# Patient Record
Sex: FEMALE | Race: WHITE | NOT HISPANIC OR LATINO | URBAN - METROPOLITAN AREA
[De-identification: names, ages, dates, MRNs, and addresses within clinical notes are randomized per-mention and may not be internally consistent; named-entity substitution may affect disease eponyms.]

---

## 2017-05-19 ENCOUNTER — EMERGENCY (EMERGENCY)
Facility: HOSPITAL | Age: 72
LOS: 1 days | Discharge: HOME | End: 2017-05-19
Attending: EMERGENCY MEDICINE | Admitting: EMERGENCY MEDICINE
Payer: MEDICARE

## 2017-05-19 VITALS
RESPIRATION RATE: 18 BRPM | DIASTOLIC BLOOD PRESSURE: 90 MMHG | TEMPERATURE: 98 F | HEART RATE: 68 BPM | SYSTOLIC BLOOD PRESSURE: 166 MMHG | OXYGEN SATURATION: 99 %

## 2017-05-19 VITALS — WEIGHT: 162.04 LBS | HEIGHT: 63 IN

## 2017-05-19 DIAGNOSIS — D32.0 BENIGN NEOPLASM OF CEREBRAL MENINGES: Chronic | ICD-10-CM

## 2017-05-19 DIAGNOSIS — M71.22 SYNOVIAL CYST OF POPLITEAL SPACE [BAKER], LEFT KNEE: ICD-10-CM

## 2017-05-19 DIAGNOSIS — Z79.899 OTHER LONG TERM (CURRENT) DRUG THERAPY: ICD-10-CM

## 2017-05-19 DIAGNOSIS — M79.662 PAIN IN LEFT LOWER LEG: ICD-10-CM

## 2017-05-19 PROCEDURE — 99284 EMERGENCY DEPT VISIT MOD MDM: CPT

## 2017-05-19 PROCEDURE — 93971 EXTREMITY STUDY: CPT | Mod: 26,LT

## 2017-05-19 RX ORDER — ESOMEPRAZOLE MAGNESIUM 40 MG/1
0 CAPSULE, DELAYED RELEASE ORAL
Qty: 0 | Refills: 0 | COMMUNITY

## 2017-05-19 NOTE — ED PROVIDER NOTE - NS ED MD SCRIBE ATTENDING SCRIBE SECTIONS
PAST MEDICAL/SURGICAL/SOCIAL HISTORY/VITAL SIGNS( Pullset)/PHYSICAL EXAM/REVIEW OF SYSTEMS/DISPOSITION/HIV/HISTORY OF PRESENT ILLNESS

## 2017-05-19 NOTE — ED PROVIDER NOTE - OBJECTIVE STATEMENT
70 yo F with a hx of GERD, meningioma presents with left leg discomfort and sent in by PMD to r/o DVT. Pt states she had a strenuous exercise last Friday and then had her  come 4 days ago. Discomfort persisted and went to get a massage when pain became worse. Pt called PMD who referred pt to this ED. Denies leg swelling, CP, SOB, difficulty breathing. Not on hormone therapy, no recent travel. Non-smoker and socially drinks.

## 2017-05-19 NOTE — ED PROVIDER NOTE - MEDICAL DECISION MAKING DETAILS
left leg discomfort sent to r/o DVT. US negative for DVT but consistent with Baker's cyst. Will give return precautions, NSAID as needed for pain and f/u with orthopedics. left leg discomfort sent to r/o DVT. US negative for DVT but consistent with popliteal cyst. Will give return precautions, NSAID as needed for pain and f/u with orthopedics.

## 2017-05-19 NOTE — ED ADULT TRIAGE NOTE - CHIEF COMPLAINT QUOTE
Pt c/o left leg pain s/p workout and massage one week ago. PMD requests doppler, endorses improvement of symptoms with compression stocking. C/o increased fatigue over last few days

## 2017-05-19 NOTE — ED PROVIDER NOTE - MUSCULOSKELETAL, MLM
Spine appears normal, range of motion is not limited, no muscle or joint tenderness, no bilateral lower extremity edema.

## 2019-02-03 ENCOUNTER — EMERGENCY (EMERGENCY)
Facility: HOSPITAL | Age: 74
LOS: 1 days | Discharge: HOME | End: 2019-02-03
Attending: EMERGENCY MEDICINE | Admitting: EMERGENCY MEDICINE
Payer: MEDICARE

## 2019-02-03 VITALS
SYSTOLIC BLOOD PRESSURE: 152 MMHG | HEART RATE: 75 BPM | OXYGEN SATURATION: 99 % | TEMPERATURE: 98 F | WEIGHT: 169.98 LBS | RESPIRATION RATE: 16 BRPM | DIASTOLIC BLOOD PRESSURE: 88 MMHG

## 2019-02-03 DIAGNOSIS — D32.0 BENIGN NEOPLASM OF CEREBRAL MENINGES: Chronic | ICD-10-CM

## 2019-02-03 PROCEDURE — 99283 EMERGENCY DEPT VISIT LOW MDM: CPT

## 2019-02-03 RX ORDER — AZTREONAM 2 G
1 VIAL (EA) INJECTION
Qty: 20 | Refills: 0 | OUTPATIENT
Start: 2019-02-03 | End: 2019-02-12

## 2019-02-03 RX ORDER — MUPIROCIN 20 MG/G
1 OINTMENT TOPICAL
Qty: 15 | Refills: 0 | OUTPATIENT
Start: 2019-02-03 | End: 2019-02-12

## 2019-02-03 RX ADMIN — Medication 1 TABLET(S): at 18:22

## 2019-02-03 NOTE — ED PROVIDER NOTE - OBJECTIVE STATEMENT
73 y.o F with PMHx thyroid surgery, Meningioma, and GERD presents to the ED with c/o abscess under right breast fold. Pt states she had sebaceous cyst 3 years ago. The cyst ruptured with purulent drainage and grew back. Pt has seen Breast surgeon at [place] for this cyst. She states she has been attempting to self- treat the sebaceous cyst. Reports area appears to be red, warm to the touch, with slight drainage. Denies fever, chills, N/V, sudden weight loss, night sweats, 73 y.o F with PMHx thyroid surgery, Meningioma, and GERD presents to the ED with c/o abscess under right breast fold. Pt states she had sebaceous cyst 3 years ago. The cyst ruptured with purulent drainage and grew back. Pt has seen Breast surgeon at [place] for this cyst. She states she has been attempting to self- treat the sebaceous cyst. Reports area appears to be red, warm to the touch, with slight drainage. Denies fever, chills, N/V, sudden weight loss, night sweats.  Denies hx of breast or other cancers.

## 2019-02-03 NOTE — ED ADULT NURSE NOTE - NSIMPLEMENTINTERV_GEN_ALL_ED
Implemented All Universal Safety Interventions:  [place] to call system. Call bell, personal items and telephone within reach. Instruct patient to call for assistance. Room bathroom lighting operational. Non-slip footwear when patient is off stretcher. Physically safe environment: no spills, clutter or unnecessary equipment. Stretcher in lowest position, wheels locked, appropriate side rails in place.

## 2019-02-03 NOTE — ED PROVIDER NOTE - GASTROINTESTINAL NEGATIVE STATEMENT, MLM
Patient will come to orthopedic office to : prescription and letter.   Patient was advised of location and hours: Yes.   Patient was advised to bring photo identification: Yes.   Patient elects another party to  item: no.   no abdominal pain, no bloating, no constipation, no diarrhea, no nausea and no vomiting. no nausea and no vomiting.

## 2019-02-03 NOTE — ED PROVIDER NOTE - NSFOLLOWUPINSTRUCTIONS_ED_ALL_ED_FT
Please follow up with your PMD as discussed.  Return if you have worsening pain, shortness of breath, fever, chills, or worsening symptoms.  Take the medication as prescribed.     Warm compresses as discussed 3-4 times  a day

## 2019-02-03 NOTE — ED PROVIDER NOTE - SKIN, MLM
1.5cm firm cystic appearing in the area of the breastfold medial aspect with surrounding erythema. No drainage or foul order.

## 2019-02-03 NOTE — ED PROVIDER NOTE - CARE PROVIDER_API CALL
Nancy Galindo)  Dermatology  [address] [place] [address], [place] [address]  Phone: [phone]  Fax: [phone]

## 2019-02-03 NOTE — ED PROVIDER NOTE - DISPOSITION TYPE
Patient was using duoneb frequently according to Dr. WILMAN amanda. If this is still case, duoneb can cause muscle spasms. If drinking a lot of water, she can try substituting Smart water or Powerade. If this does not work would need to be evaluated in clinic     DISCHARGE

## 2019-02-07 DIAGNOSIS — N61.1 ABSCESS OF THE BREAST AND NIPPLE: ICD-10-CM

## 2019-02-07 DIAGNOSIS — Z79.899 OTHER LONG TERM (CURRENT) DRUG THERAPY: ICD-10-CM

## 2019-02-07 DIAGNOSIS — Z79.2 LONG TERM (CURRENT) USE OF ANTIBIOTICS: ICD-10-CM

## 2019-06-16 ENCOUNTER — EMERGENCY (EMERGENCY)
Facility: HOSPITAL | Age: 74
LOS: 1 days | Discharge: HOME | End: 2019-06-16
Admitting: EMERGENCY MEDICINE
Payer: MEDICARE

## 2019-06-16 VITALS
DIASTOLIC BLOOD PRESSURE: 90 MMHG | HEART RATE: 75 BPM | TEMPERATURE: 98 F | SYSTOLIC BLOOD PRESSURE: 160 MMHG | RESPIRATION RATE: 18 BRPM | OXYGEN SATURATION: 98 %

## 2019-06-16 DIAGNOSIS — L03.312 CELLULITIS OF BACK [ANY PART EXCEPT BUTTOCK]: ICD-10-CM
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
## 2019-06-16 DIAGNOSIS — D32.0 BENIGN NEOPLASM OF CEREBRAL MENINGES: Chronic | ICD-10-CM

## 2019-06-16 DIAGNOSIS — T21.24XD BURN OF SECOND DEGREE OF LOWER BACK, SUBSEQUENT ENCOUNTER: ICD-10-CM

## 2019-06-16 DIAGNOSIS — Z79.899 OTHER LONG TERM (CURRENT) DRUG THERAPY: ICD-10-CM

## 2019-06-16 PROCEDURE — 99283 EMERGENCY DEPT VISIT LOW MDM: CPT

## 2019-06-16 RX ORDER — CEPHALEXIN 500 MG
1 CAPSULE ORAL
Qty: 28 | Refills: 0
Start: 2019-06-16 | End: 2019-06-22

## 2019-06-16 RX ORDER — CEPHALEXIN 500 MG
500 CAPSULE ORAL ONCE
Refills: 0 | Status: COMPLETED | OUTPATIENT
Start: 2019-06-16 | End: 2019-06-16

## 2019-06-16 RX ORDER — IBUPROFEN 200 MG
600 TABLET ORAL ONCE
Refills: 0 | Status: COMPLETED | OUTPATIENT
Start: 2019-06-16 | End: 2019-06-16

## 2019-06-16 RX ADMIN — Medication 600 MILLIGRAM(S): at 21:06

## 2019-06-16 RX ADMIN — Medication 500 MILLIGRAM(S): at 21:06
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
## 2019-06-16 NOTE — ED ADULT NURSE NOTE - NSIMPLEMENTINTERV_GEN_ALL_ED
Implemented All Universal Safety Interventions:  [place] to call system. Call bell, personal items and telephone within reach. Instruct patient to call for assistance. Room bathroom lighting operational. Non-slip footwear when patient is off stretcher. Physically safe environment: no spills, clutter or unnecessary equipment. Stretcher in lowest position, wheels locked, appropriate side rails in place.

## 2019-06-16 NOTE — ED PROVIDER NOTE - PHYSICAL EXAMINATION
CONSTITUTIONAL: Well-appearing; well-nourished; in no apparent distress.   HEAD: Normocephalic; atraumatic.   EYES: PERRL; EOM intact; conjunctiva and sclera clear  ENT: normal nose; no rhinorrhea; normal pharynx with no erythema or lesions.   NECK: Supple; non-tender;   CARDIOVASCULAR: Normal S1, S2; no murmurs, rubs, or gallops. Regular rate and rhythm.   RESPIRATORY: Breathing easily; breath sounds clear and equal bilaterally; no wheezes, rhonchi, or rales.  MSK: FROM at all extremities, normal tone   EXT: No cyanosis or edema; N/V intact  SKIN: R lower back 3 circular areas ~3x3 cm each of healing blister with scaling and erythema. No surrounding erythema or warmth, no drainage

## 2019-06-16 NOTE — ED PROVIDER NOTE - NSFOLLOWUPINSTRUCTIONS_ED_ALL_ED_FT
Burn    A burn is an injury to your skin or the tissues under your skin usually caused by heat or caustic chemicals. In severe cases, a burn can damage the muscles and bones under the skin. There are three different degrees of burns: first (mild), second, and third (severe). Make sure to use any prescribed ointments as directed. If you were prescribed antibiotic medicine, take it as told by your health care provider. Do not stop using the antibiotic even if your condition improves. Follow up is available at the burn clinic.    SEEK IMMEDIATE MEDICAL CARE IF YOU HAVE ANY OF THE FOLLOWING SYMPTOMS: red streaks near the burn, severe pain, or fever.    Cellulitis    Cellulitis is a skin infection caused by bacteria. This condition occurs most often in the arms and lower legs but can occur anywhere over the body. Symptoms include redness, swelling, warm skin, tenderness, and chills/fever. If you were prescribed an antibiotic medicine, take it as told by your health care provider. Do not stop taking the antibiotic even if you start to feel better.    SEEK IMMEDIATE MEDICAL CARE IF YOU HAVE ANY OF THE FOLLOWING SYMPTOMS: worsening fever, red streaks coming from affected area, vomiting or diarrhea, or dizziness/lightheadedness.
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
## 2019-09-16 NOTE — ED ADULT NURSE NOTE - CAS EDN DISCHARGE ASSESSMENT
[FreeTextEntry1] : I have personally reviewed all relevant imaging studies (PET, CT) and pathology and endoscopy reports. I have discussed the case with the referring physician Dr. Clifton and Dr. Caba.\par  Alert and oriented to person, place and time

## 2020-08-12 ENCOUNTER — APPOINTMENT (OUTPATIENT)
Dept: OTOLARYNGOLOGY | Facility: CLINIC | Age: 75
End: 2020-08-12
Payer: MEDICARE

## 2020-08-12 VITALS — TEMPERATURE: 97.8 F | BODY MASS INDEX: 27.66 KG/M2 | WEIGHT: 162 LBS | HEIGHT: 64 IN

## 2020-08-12 DIAGNOSIS — K21.9 GASTRO-ESOPHAGEAL REFLUX DISEASE W/OUT ESOPHAGITIS: ICD-10-CM

## 2020-08-12 DIAGNOSIS — Z83.3 FAMILY HISTORY OF DIABETES MELLITUS: ICD-10-CM

## 2020-08-12 DIAGNOSIS — Z82.49 FAMILY HISTORY OF ISCHEMIC HEART DISEASE AND OTHER DISEASES OF THE CIRCULATORY SYSTEM: ICD-10-CM

## 2020-08-12 DIAGNOSIS — Z78.9 OTHER SPECIFIED HEALTH STATUS: ICD-10-CM

## 2020-08-12 PROBLEM — Z00.00 ENCOUNTER FOR PREVENTIVE HEALTH EXAMINATION: Status: ACTIVE | Noted: 2020-08-12

## 2020-08-12 PROCEDURE — 99213 OFFICE O/P EST LOW 20 MIN: CPT | Mod: 25

## 2020-08-12 PROCEDURE — 69210 REMOVE IMPACTED EAR WAX UNI: CPT

## 2020-08-12 RX ORDER — ESOMEPRAZOLE MAGNESIUM 40 MG/1
CAPSULE, DELAYED RELEASE ORAL
Refills: 0 | Status: ACTIVE | COMMUNITY

## 2020-08-12 RX ORDER — LEVOTHYROXINE SODIUM 137 UG/1
TABLET ORAL
Refills: 0 | Status: ACTIVE | COMMUNITY

## 2020-08-12 NOTE — HISTORY OF PRESENT ILLNESS
[de-identified] : APOORVA SORENSON is a 74 year woman with a history of thyroidectomy for Goiter several years ago. She had been having discomfort in the neck. She thinks it may be due to excessive  Pilates work outs. When she refrains she feels better. Recent US is normal. It showed  some lymph nodes with normal morphology and size.\par \par She is also having GERD. She uses Nexium but gets burning in her chest particularly if she misses a dose.

## 2020-08-12 NOTE — ASSESSMENT
[FreeTextEntry1] : APOORVA SORENSON is doing well. I reassured her regarding her neck.\par I discussed reflux diet with her and she will make some changes.

## 2020-08-12 NOTE — REVIEW OF SYSTEMS
[Vertigo] : vertigo [Throat Clearing] : throat clearing [Negative] : Endocrine [Patient Intake Form Reviewed] : Patient intake form was reviewed [de-identified] : post nasal drip, watery eyes  [de-identified] : refulux

## 2021-02-10 ENCOUNTER — APPOINTMENT (OUTPATIENT)
Dept: OTOLARYNGOLOGY | Facility: CLINIC | Age: 76
End: 2021-02-10
Payer: MEDICARE

## 2021-02-10 VITALS — BODY MASS INDEX: 27.66 KG/M2 | TEMPERATURE: 98.7 F | HEIGHT: 64 IN | WEIGHT: 162 LBS

## 2021-02-10 PROCEDURE — 99212 OFFICE O/P EST SF 10 MIN: CPT | Mod: 25

## 2021-02-10 PROCEDURE — 69210 REMOVE IMPACTED EAR WAX UNI: CPT

## 2021-02-10 RX ORDER — SIMVASTATIN 20 MG/1
20 TABLET, FILM COATED ORAL
Qty: 90 | Refills: 0 | Status: ACTIVE | COMMUNITY
Start: 2021-01-13

## 2021-02-10 RX ORDER — METFORMIN HYDROCHLORIDE 500 MG/1
500 TABLET, COATED ORAL
Qty: 60 | Refills: 0 | Status: ACTIVE | COMMUNITY
Start: 2020-11-30

## 2021-02-10 RX ORDER — SUMATRIPTAN 50 MG/1
50 TABLET, FILM COATED ORAL
Qty: 10 | Refills: 0 | Status: ACTIVE | COMMUNITY
Start: 2021-01-18

## 2021-02-10 RX ORDER — LEVOTHYROXINE SODIUM 0.17 MG/1
175 TABLET ORAL
Qty: 90 | Refills: 0 | Status: ACTIVE | COMMUNITY
Start: 2020-03-12

## 2021-02-10 NOTE — HISTORY OF PRESENT ILLNESS
[de-identified] : APOORVA SORENSON is a 75 year woman with a history of Goiter. She feels that she has a growth in her right ear.

## 2021-02-10 NOTE — ASSESSMENT
[FreeTextEntry1] : APOORVA SORENSON has benign cyst of left scapha. I suggested she see Dr. Conley if she wants it excised.

## 2021-02-10 NOTE — REVIEW OF SYSTEMS
[Ear Pain] : ear pain [Negative] : Heme/Lymph [Patient Intake Form Reviewed] : Patient intake form was reviewed [de-identified] : growth in ear

## 2021-08-04 ENCOUNTER — APPOINTMENT (OUTPATIENT)
Dept: OTOLARYNGOLOGY | Facility: CLINIC | Age: 76
End: 2021-08-04
Payer: MEDICARE

## 2021-08-04 VITALS — HEIGHT: 64 IN | BODY MASS INDEX: 27.66 KG/M2 | TEMPERATURE: 95.1 F | WEIGHT: 162 LBS

## 2021-08-04 DIAGNOSIS — Q18.1 PREAURICULAR SINUS AND CYST: ICD-10-CM

## 2021-08-04 PROCEDURE — 99213 OFFICE O/P EST LOW 20 MIN: CPT | Mod: 25

## 2021-08-04 PROCEDURE — 69210 REMOVE IMPACTED EAR WAX UNI: CPT
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
## 2022-02-02 ENCOUNTER — APPOINTMENT (OUTPATIENT)
Dept: OTOLARYNGOLOGY | Facility: CLINIC | Age: 77
End: 2022-02-02

## 2022-11-03 NOTE — ED PROVIDER NOTE - CPE EDP NEURO NORM
Patient LM asking if full face mask had been ordered because she hasn't received anything yet. I called back and informed patient he did send the order, but she would need to call DME to actually order mask. I provided [facility]'s number. normal...

## 2023-01-28 NOTE — ED PROVIDER NOTE - MEDICAL DECISION MAKING DETAILS
Luda Keith Possible infected cyst.  ~ 1 cm, no drainage although has been draining at home.  NO fever or chills.  Located in the inferior breast fold.  Reviewed vitals.  Treat with topical and oral antibiotics + warm compress.  Will refer to dermatology for definitive care.  No role for I and D at this time.  Conservative management discussed with the patient in detail.    Strict ED return instructions discussed in detail and patient given the opportunity to ask any questions about their discharge diagnosis and instructions

## 2023-06-28 ENCOUNTER — APPOINTMENT (OUTPATIENT)
Dept: OTOLARYNGOLOGY | Facility: CLINIC | Age: 78
End: 2023-06-28
Payer: MEDICARE

## 2023-06-28 DIAGNOSIS — H61.23 IMPACTED CERUMEN, BILATERAL: ICD-10-CM

## 2023-06-28 DIAGNOSIS — H93.8X9 OTHER SPECIFIED DISORDERS OF EAR, UNSPECIFIED EAR: ICD-10-CM

## 2023-06-28 PROCEDURE — 99212 OFFICE O/P EST SF 10 MIN: CPT | Mod: 25

## 2023-06-28 PROCEDURE — 31575 DIAGNOSTIC LARYNGOSCOPY: CPT | Mod: 1L

## 2023-06-28 PROCEDURE — 69210 REMOVE IMPACTED EAR WAX UNI: CPT
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
## 2023-12-20 ENCOUNTER — APPOINTMENT (OUTPATIENT)
Dept: OTOLARYNGOLOGY | Facility: CLINIC | Age: 78
End: 2023-12-20

## 2024-10-09 ENCOUNTER — APPOINTMENT (OUTPATIENT)
Dept: OTOLARYNGOLOGY | Facility: CLINIC | Age: 79
End: 2024-10-09
Payer: MEDICARE

## 2024-10-09 DIAGNOSIS — K21.9 GASTRO-ESOPHAGEAL REFLUX DISEASE W/OUT ESOPHAGITIS: ICD-10-CM

## 2024-10-09 DIAGNOSIS — H61.23 IMPACTED CERUMEN, BILATERAL: ICD-10-CM

## 2024-10-09 PROCEDURE — 69210 REMOVE IMPACTED EAR WAX UNI: CPT

## 2024-10-09 PROCEDURE — 99213 OFFICE O/P EST LOW 20 MIN: CPT | Mod: 25

## 2024-10-09 PROCEDURE — 31575 DIAGNOSTIC LARYNGOSCOPY: CPT

## 2024-11-11 NOTE — ED ADULT TRIAGE NOTE - NS ED NURSE BANDS TYPE
Name band;
Heart beating fast x 30 min, Left arm #18 IV smoked marijuana and increased nicotine cigarettes x4 days H/O IBS
1 or 2

## 2024-11-13 ENCOUNTER — APPOINTMENT (OUTPATIENT)
Dept: OTOLARYNGOLOGY | Facility: CLINIC | Age: 79
End: 2024-11-13

## 2025-05-14 ENCOUNTER — APPOINTMENT (OUTPATIENT)
Dept: OTOLARYNGOLOGY | Facility: CLINIC | Age: 80
End: 2025-05-14
Payer: MEDICARE

## 2025-05-14 ENCOUNTER — NON-APPOINTMENT (OUTPATIENT)
Age: 80
End: 2025-05-14

## 2025-05-14 DIAGNOSIS — H61.23 IMPACTED CERUMEN, BILATERAL: ICD-10-CM

## 2025-05-14 DIAGNOSIS — K21.9 GASTRO-ESOPHAGEAL REFLUX DISEASE W/OUT ESOPHAGITIS: ICD-10-CM

## 2025-05-14 PROCEDURE — 69210 REMOVE IMPACTED EAR WAX UNI: CPT

## 2025-05-14 PROCEDURE — 99213 OFFICE O/P EST LOW 20 MIN: CPT | Mod: 25
